# Patient Record
Sex: MALE | Race: WHITE | Employment: UNEMPLOYED | URBAN - METROPOLITAN AREA
[De-identification: names, ages, dates, MRNs, and addresses within clinical notes are randomized per-mention and may not be internally consistent; named-entity substitution may affect disease eponyms.]

---

## 2021-07-29 ENCOUNTER — OFFICE VISIT (OUTPATIENT)
Dept: FAMILY MEDICINE | Facility: CLINIC | Age: 36
End: 2021-07-29
Payer: MEDICAID

## 2021-07-29 VITALS
DIASTOLIC BLOOD PRESSURE: 79 MMHG | TEMPERATURE: 98.1 F | SYSTOLIC BLOOD PRESSURE: 133 MMHG | HEART RATE: 89 BPM | BODY MASS INDEX: 27.77 KG/M2 | HEIGHT: 71 IN | OXYGEN SATURATION: 100 % | WEIGHT: 198.4 LBS

## 2021-07-29 DIAGNOSIS — Z13.220 SCREENING FOR HYPERLIPIDEMIA: ICD-10-CM

## 2021-07-29 DIAGNOSIS — Z00.00 ROUTINE GENERAL MEDICAL EXAMINATION AT A HEALTH CARE FACILITY: Primary | ICD-10-CM

## 2021-07-29 DIAGNOSIS — G40.409 GRAND MAL SEIZURE (H): ICD-10-CM

## 2021-07-29 DIAGNOSIS — Z11.59 NEED FOR HEPATITIS C SCREENING TEST: ICD-10-CM

## 2021-07-29 DIAGNOSIS — Z11.4 SCREENING FOR HIV (HUMAN IMMUNODEFICIENCY VIRUS): ICD-10-CM

## 2021-07-29 LAB
CHOLEST SERPL-MCNC: 158 MG/DL
FASTING STATUS PATIENT QL REPORTED: YES
HDLC SERPL-MCNC: 79 MG/DL
LDLC SERPL CALC-MCNC: 66 MG/DL
NONHDLC SERPL-MCNC: 79 MG/DL
TRIGL SERPL-MCNC: 66 MG/DL

## 2021-07-29 PROCEDURE — 90471 IMMUNIZATION ADMIN: CPT | Performed by: PHYSICIAN ASSISTANT

## 2021-07-29 PROCEDURE — 87389 HIV-1 AG W/HIV-1&-2 AB AG IA: CPT | Performed by: PHYSICIAN ASSISTANT

## 2021-07-29 PROCEDURE — 90732 PPSV23 VACC 2 YRS+ SUBQ/IM: CPT | Performed by: PHYSICIAN ASSISTANT

## 2021-07-29 PROCEDURE — 36415 COLL VENOUS BLD VENIPUNCTURE: CPT | Performed by: PHYSICIAN ASSISTANT

## 2021-07-29 PROCEDURE — 90715 TDAP VACCINE 7 YRS/> IM: CPT | Performed by: PHYSICIAN ASSISTANT

## 2021-07-29 PROCEDURE — 90472 IMMUNIZATION ADMIN EACH ADD: CPT | Performed by: PHYSICIAN ASSISTANT

## 2021-07-29 PROCEDURE — 80061 LIPID PANEL: CPT | Performed by: PHYSICIAN ASSISTANT

## 2021-07-29 PROCEDURE — 86803 HEPATITIS C AB TEST: CPT | Performed by: PHYSICIAN ASSISTANT

## 2021-07-29 PROCEDURE — 99385 PREV VISIT NEW AGE 18-39: CPT | Mod: 25 | Performed by: PHYSICIAN ASSISTANT

## 2021-07-29 RX ORDER — LEVETIRACETAM 1000 MG/1
1000 TABLET ORAL DAILY
Qty: 180 TABLET | Refills: 1 | Status: SHIPPED | OUTPATIENT
Start: 2021-07-29 | End: 2021-11-05

## 2021-07-29 ASSESSMENT — ENCOUNTER SYMPTOMS
MYALGIAS: 0
NERVOUS/ANXIOUS: 0
HEMATOCHEZIA: 0
SORE THROAT: 0
PARESTHESIAS: 0
HEARTBURN: 0
JOINT SWELLING: 0
HEADACHES: 1
WEAKNESS: 0
DIARRHEA: 1
CONSTIPATION: 0
FEVER: 0
ARTHRALGIAS: 0
NAUSEA: 0
DYSURIA: 0
DIZZINESS: 0
EYE PAIN: 0
FREQUENCY: 0
PALPITATIONS: 0
SHORTNESS OF BREATH: 0
CHILLS: 0
COUGH: 0
HEMATURIA: 0
ABDOMINAL PAIN: 0

## 2021-07-29 ASSESSMENT — MIFFLIN-ST. JEOR: SCORE: 1861.19

## 2021-07-29 NOTE — PROGRESS NOTES
SUBJECTIVE:   CC: Ángel Nesbitt is an 36 year old male who presents for preventative health visit with sister and mother      Patient has been advised of split billing requirements and indicates understanding: Yes  Healthy Habits:     Getting at least 3 servings of Calcium per day:  Yes    Bi-annual eye exam:  Yes    Dental care twice a year:  NO    Sleep apnea or symptoms of sleep apnea:  None    Diet:  Regular (no restrictions)    Frequency of exercise:  2-3 days/week    Duration of exercise:  15-30 minutes    Taking medications regularly:  Yes    Barriers to taking medications:  None    Medication side effects:  None    PHQ-2 Total Score: 0    Additional concerns today:  No          -------------------------------------    Today's PHQ-2 Score:   PHQ-2 ( 1999 [employer]) 7/29/2021   Q1: Little interest or pleasure in doing things 0   Q2: Feeling down, depressed or hopeless 0   PHQ-2 Score 0   Q1: Little interest or pleasure in doing things Not at all   Q2: Feeling down, depressed or hopeless Not at all   PHQ-2 Score 0       Abuse: Current or Past(Physical, Sexual or Emotional)- No  Do you feel safe in your environment? Yes    Have you ever done Advance Care Planning? (For example, a Health Directive, POLST, or a discussion with a medical provider or your loved ones about your wishes): No, advance care planning information given to patient to review.  Patient declined advance care planning discussion at this time.    Social History     Tobacco Use     Smoking status: Current Every Day Smoker     Packs/day: 0.50     Types: Cigarettes     Smokeless tobacco: Never Used   Substance Use Topics     Alcohol use: Never     If you drink alcohol do you typically have >3 drinks per day or >7 drinks per week? No    Alcohol Use 7/29/2021   Prescreen: >3 drinks/day or >7 drinks/week? No       Last PSA: No results found for: PSA    Reviewed orders with patient. Reviewed health maintenance and updated orders accordingly -  "Yes      Reviewed and updated as needed this visit by clinical staff  Tobacco  Allergies  Meds   Med Hx  Surg Hx  Fam Hx  Soc Hx      Patient had a grand mal seizure 7/23/21 work up was done in [place] where the patient was traveling.  Hospital notes reviewed.  Patient amenable to neurology referral for additional work up. Medication management continued.     Review of Systems   Constitutional: Negative for chills and fever.   HENT: Negative for congestion, ear pain, hearing loss and sore throat.    Eyes: Negative for pain and visual disturbance.   Respiratory: Negative for cough and shortness of breath.    Cardiovascular: Negative for chest pain, palpitations and peripheral edema.   Gastrointestinal: Positive for diarrhea. Negative for abdominal pain, constipation, heartburn, hematochezia and nausea.   Genitourinary: Negative for dysuria, frequency, genital sores, hematuria and urgency.   Musculoskeletal: Negative for arthralgias, joint swelling and myalgias.   Skin: Negative for rash.   Neurological: Positive for headaches. Negative for dizziness, weakness and paresthesias.   Psychiatric/Behavioral: Negative for mood changes. The patient is not nervous/anxious.          OBJECTIVE:   /79   Pulse 89   Temp 98.1  F (36.7  C) (Oral)   Ht 1.81 m (5' 11.26\")   Wt 90 kg (198 lb 6.4 oz)   SpO2 100%   BMI 27.47 kg/m      Physical Exam  GENERAL: healthy, alert and no distress  EYES: Eyes grossly normal to inspection, PERRL and conjunctivae and sclerae normal  HENT: ear canals and TM's normal, nose and mouth without ulcers or lesions  NECK: no adenopathy, no asymmetry, masses, or scars and thyroid normal to palpation  RESP: lungs clear to auscultation - no rales, rhonchi or wheezes  CV: regular rate and rhythm, normal S1 S2, no S3 or S4, no murmur, click or rub, no peripheral edema and peripheral pulses strong  ABDOMEN: soft, nontender, no hepatosplenomegaly, no masses and bowel sounds normal  MS: no gross " "musculoskeletal defects noted, no edema  SKIN: no suspicious lesions or rashes  NEURO: Normal strength and tone, mentation intact and speech normal  PSYCH: mentation appears normal, affect normal/bright    Diagnostic Test Results:  none     ASSESSMENT/PLAN:   1. Routine general medical examination at a health care facility    2. Grand mal seizure (H)    3. Screening for HIV (human immunodeficiency virus)    4. Need for hepatitis C screening test  - Hepatitis C Screen Reflex to HCV RNA Quant and Genotype; Future    5. Screening for hyperlipidemia      Patient has been advised of split billing requirements and indicates understanding: Yes  COUNSELING:   Reviewed preventive health counseling, as reflected in patient instructions    Estimated body mass index is 27.47 kg/m  as calculated from the following:    Height as of this encounter: 1.81 m (5' 11.26\").    Weight as of this encounter: 90 kg (198 lb 6.4 oz).     Weight management plan: Discussed healthy diet and exercise guidelines    He reports that he has been smoking cigarettes. He has been smoking about 0.50 packs per day. He has never used smokeless tobacco.  Tobacco Cessation Action Plan:   Information offered: Patient not interested at this time      Counseling Resources:  ATP IV Guidelines  Pooled Cohorts Equation Calculator  FRAX Risk Assessment  ICSI Preventive Guidelines  Dietary Guidelines for Americans, 2010  USDA's MyPlate  ASA Prophylaxis  Lung CA Screening    Joselito Mayen PA-C  [facility]  "

## 2021-07-30 LAB
HCV AB SERPL QL IA: NONREACTIVE
HIV 1+2 AB+HIV1 P24 AG SERPL QL IA: NONREACTIVE

## 2021-08-03 ENCOUNTER — HOSPITAL ENCOUNTER (OUTPATIENT)
Dept: BEHAVIORAL HEALTH | Facility: CLINIC | Age: 36
Discharge: HOME | End: 2021-08-03
Attending: FAMILY MEDICINE | Admitting: FAMILY MEDICINE
Payer: MEDICAID

## 2021-08-03 ENCOUNTER — TELEPHONE (OUTPATIENT)
Dept: BEHAVIORAL HEALTH | Facility: CLINIC | Age: 36
End: 2021-08-03

## 2021-08-03 VITALS — WEIGHT: 197 LBS | HEIGHT: 72 IN | BODY MASS INDEX: 26.68 KG/M2

## 2021-08-03 PROCEDURE — H0001 ALCOHOL AND/OR DRUG ASSESS: HCPCS | Mod: GT | Performed by: COUNSELOR

## 2021-08-03 ASSESSMENT — ANXIETY QUESTIONNAIRES
7. FEELING AFRAID AS IF SOMETHING AWFUL MIGHT HAPPEN: NOT AT ALL
2. NOT BEING ABLE TO STOP OR CONTROL WORRYING: NOT AT ALL
GAD7 TOTAL SCORE: 3
1. FEELING NERVOUS, ANXIOUS, OR ON EDGE: SEVERAL DAYS
4. TROUBLE RELAXING: NOT AT ALL
5. BEING SO RESTLESS THAT IT IS HARD TO SIT STILL: NOT AT ALL
3. WORRYING TOO MUCH ABOUT DIFFERENT THINGS: SEVERAL DAYS
6. BECOMING EASILY ANNOYED OR IRRITABLE: SEVERAL DAYS

## 2021-08-03 ASSESSMENT — PATIENT HEALTH QUESTIONNAIRE - PHQ9: SUM OF ALL RESPONSES TO PHQ QUESTIONS 1-9: 9

## 2021-08-03 ASSESSMENT — MIFFLIN-ST. JEOR: SCORE: 1858.65

## 2021-08-03 ASSESSMENT — PAIN SCALES - GENERAL: PAINLEVEL: NO PAIN (0)

## 2021-08-03 NOTE — TELEPHONE ENCOUNTER
8/3/2021  Pt completed his IGOR CA today. He is looking for a mental health therapist at this time. He was referred to [facility].    [facility] Assessment ID: [id number]

## 2021-08-03 NOTE — PROGRESS NOTES
"[facility]  Provider Name:  Zahraa Lara MA [facility]  Provider Phone Number: [phone]    PATIENT'S NAME: Ángel Nesbitt  PREFERRED NAME: Eliza  PRONOUNS:  he/him     MRN: [id number]  : 1985  ADDRESS: [address]  [place] [address]  ACCT. NUMBER:  [id number]  DATE OF SERVICE: 21  START TIME: 1:09pm  END TIME: 2:40pm  PREFERRED PHONE: [phone]  May we leave a program related message: Yes  SERVICE MODALITY:  Video Visit:      Provider verified identity through the following two step process.  Patient provided:  Patient  and Patient's last 4 digits of N    Telemedicine Visit: The patient's condition can be safely assessed and treated via synchronous audio and visual telemedicine encounter.      Reason for Telemedicine Visit: Patient has requested telehealth visit    Originating Site (Patient Location): Patient's home    Distant Site (Provider Location): Provider Remote Setting- Home Office    Consent:  The patient/guardian has verbally consented to: the potential risks and benefits of telemedicine (video visit) versus in person care; bill my insurance or make self-payment for services provided; and responsibility for payment of non-covered services.     Patient would like the video invitation sent by:  My Chart    Mode of Communication:  Video Conference via Impraise    As the provider I attest to compliance with applicable laws and regulations related to telemedicine.    UNIVERSAL ADULT Substance Use Disorder DIAGNOSTIC ASSESSMENT    Identifying Information:  Patient is a 36 year old, [place].  The pronoun use throughout this assessment reflects the patient's chosen pronoun.  Patient was referred for an assessment by family.  Patient attended the session alone.     Chief Complaint:   The reason for seeking services at this time is: \"/, I had a problem with alcohol. I was drinking every day. I went to rehab. Just " "depression. Loosing someone every day. I made it a habit every night. The people around me would drink every night. During the day chew Khaat leaves. It became a daily habit. I broke it off in 2017. From 2017, I became moderate. I went to rehab and I was off a year.\" Patient has attempted to resolve these concerns in the past through treatment and AA meetings.  Patient does not appear to be in severe withdrawal, an imminent safety risk to self or others, or requiring immediate medical attention and may proceed with the assessment interview.    Social/Family History:  Patient reported they grew up in [place], [place].  They were raised by their mom mostly. Patient reported that their childhood was \"a lot of fun. Childhood was nice. A little bit of pressure for being the eldest son. Other than that, a nice upbringing. I was very athletic. I played soccer and basketball.\" He has 8 siblings and he has 2 older sisters and he is the eldest boy. Patient describes current relationships with family of origin as good.      The patient describes their cultural background as [place]. He was raised as [facility]. Cultural influences and impact on patient's life structure, values, norms, and healthcare: none identified.  Contextual influences on patient's health include: none identified.  Patient identified their preferred language to be English. Patient reported they does not need the assistance of an  or other support involved in therapy.  Patient reports they are involved in community of joi activities. \"I am not really religous.\" They reports spirituality impacts recovery in the following ways: \"my family is very spiritual, but I am not. It does. If I was a 1/10th more leaning towards Orthodoxy, it would help me more.\"     Patient reported had no significant delays in developmental tasks. Patient's highest education level was some college. Patient identified the following learning problems: attention. He reports a history of " "ADHD and a studder as a child. Patient reports they are able to understand written materials.    Patient's current relationship status is single for almost 3 years.  Patient identified their sexual orientation as heterosexual.  Patient reported having two child(adi). He has a 10 year old daughter, and a 5 year old son.    Patient's current living/housing situation involves staying with his sister.  They live with their sister and they report that housing is stable. He reports his mom lives across the street. Patient identified mother, siblings and friends as part of their support system.  Patient identified the quality of these relationships as good.      Patient reports engaging in the following recreational/leisure activities: \"I like to play billiards, basketball, sometimes I like to go jogging. Mostly I like playing basketball or shooting pool.\"  Patient is currently is employed as a contractor.  Patient reports their income is obtained through savings/shares in [employer].  Patient does not identify finances as a current stressor.      Patient denies substance related arrests or legal issues.  Patient denies being on probation / parole / under the jurisdiction of the court.      Patient's Strengths and Limitations:  Patient identified the following strengths or resources that will help them succeed in treatment: commitment to health and well being, friends / good social support, family support and insight. Things that may interfere with the patient's success in treatment include: none identified.     Personal and Family Medical History:  Patient did not report a family history of mental health concerns.  Patient reports family history is not on file.    Patient reported the following previous mental health diagnoses: \"he said I got bipolar. I don't really believe it, but that is what the doctor said.\"  Patient reports their primary mental health symptoms include:  anxiety and these do not impact his ability to " function.  Patient has received mental health services in the past: therapy.  Psychiatric Hospitalizations: None.  Patient denies a history of civil commitment.  Current mental health services/providers include:  none.    GAIN-SS Tool:    When was the last time that you had significant problems... 8/3/2021   with feeling very trapped, lonely, sad, blue, depressed or hopeless about the future? Past month   with sleep trouble, such as bad dreams, sleeping restlessly, or falling asleep during the day? Past Month   with feeling very anxious, nervous, tense, scared, panicked or like something bad was going to happen? 1+ years ago   with becoming very distressed & upset when something reminded you of the past? 1+ years ago   with thinking about ending your life or committing suicide? Never     When was the last time that you did the following things 2 or more times? 8/3/2021   Lied or conned to get things you wanted or to avoid having to do something? Never   Had a hard time paying attention at school, work or home? Never   Had a hard time listening to instructions at school, work or home? Never   Were a bully or threatened other people? 1+ years ago   Started physical fights with other people? Never       Patient has had a physical exam to rule out medical causes for current symptoms.  Date of last physical exam was last week. The patient has a [facility].  Patient reports the following current medical concerns: seizure on 7/22/2021.  Patient denies any issues with pain.  There are significant appetite / nutritional concerns / weight changes.  Patient does not report a history of an eating disorder.  Patient does not report a history of head injury / trauma / cognitive impairment.      Current Outpatient Medications   Medication     levETIRAcetam (KEPPRA) 1000 MG tablet     No current facility-administered medications for this encounter.     Medication Adherence:  Patient reports taking prescribed  medications as prescribed.    Patient Allergies:  No Known Allergies    Medical History:  History reviewed. No pertinent past medical history.     Rating Scales:    PHQ9:    PHQ-9 SCORE 8/3/2021   PHQ-9 Total Score 9     GAD7:    ADAM-7 SCORE 8/3/2021   Total Score 3       Substance Use:  Patient reports a brother who used and a sister as well who had an addiction problem.  Patient has received substance use disorder and/or gambling treatment in the past.  Patient reports the following dates and locations of treatment services:  rehab twice, the last time was 2017.  Patient has not ever been to detox.  Patient is not currently receiving any chemical dependency treatment. Patient reports they have attended the following support groups: AA meetings in the past. The last time he attended was in 2018.        Substance Age of first use Pattern and duration of use (include amounts and frequency) Date of last use     Withdrawal potential Route of administration   has used Alcohol 8th grade HU: 2014/2015-2017: daily use 2/2020 no oral   has used Marijuana   8th grade HU: 3936-4945: daily use    Past year: gets paranoid when using. Uses maybe once a month and he will take a couple of puffs.   2 months ago no smoke   has not used Amphetamines          has not used Cocaine/crack           has not used Hallucinogens        has not used Inhalants        has not used Heroin        has used Other Opiates  HU: past few months for foot pain.  no A few months ago no    has not used Benzodiazepine   '17 Xanax:  Abuse: no  Non-prescribed, but would use it for sleep.  2017: hx of being prescribed for 6 months.   6/2021 no oral   has not used Barbiturates        has not used Over the counter meds.        has use Caffeine 20 Energy Drink: drinking every other year.  Last use: 7/22/21    Coffee: hx of daily use. He'd have 3 cups a day.   Last use: 7/22/21 7/22/21 no oral   has used Nicotine  17 1 pack over 3-4 days. 8/3/21 no smoke   has  "used other substances not listed above:  Identify:  16 Khaat:  First use: 17  Last use: 1.5 years ago  HU: 9174-4187, daily use 1.5 years ago no oral       Patient reported the following problems as a result of their substance use: family problems.  Patient first became concerned about their substance use \"when I was 17.\" Patient reports his mom and his sister are concerned about their substance use.  Patient reports their recovery goals are to address his mental health and stay sober.     Patient reports experiencing the following withdrawal symptoms within the past 12 months: none and the following within the past 30 days: none.  Patients reports no urges/cravings to use Alcohol.  Patient reports he has used more Alcohol than intended and over a longer period of time than intended in the past. Patient reports he has had unsuccessful attempts to cut down or control use of Alcohol.  Patient reports longest period of abstinence was about a year and return to use was due to socialize. He is staying sober currently because \"I wanted to. One thing that helps me is being around my family. I don't want the kids to see me out of line.\" Patient reports he has needed to use more Alcohol to achieve the same effect.  Patient does  report diminished effect with use of same amount of Alcohol.     Patient does report a great deal of time is spent in activities necessary to obtain, use, or recover from Alcohol effects.  Patient does report important social, occupational, or recreational activities are given up or reduced because of Alcohol use.  Alcohol use is continued despite knowledge of having a persistent or recurrent physical or psychological problem that is likely to have caused or exacerbated by use.  Patient reports the following problem behaviors while under the influence of substances: none reported.     Patient reports substance use has not ever impacted their ability to function in a school setting. Patient reports " substance use has not ever impacted their ability to function in a work setting.  Patients demographics and history impact their recovery in the following ways:  Family support.  Patient reports engaging in the following recreation/leisure activities while using:  With his friends.  Patient reports the following people are supportive of recovery: family.    Patient does not have a history of gambling concerns and/or treatment.  Patient does not have other addictive behaviors he is concerned about.        Dimension Scale Ratings:    Dimension 1 -  Acute Intoxication/Withdrawal: 0 - No Problem  Dimension 2 - Biomedical: 0 - No Problem  Dimension 3 - Emotional/Behavioral/Cognitive Conditions: 2 - Moderate Problem  Dimension 4 - Readiness to Change:  0 - No Problem  Dimension 5 - Relapse/Continued Use/ Continued Problem Potential: 2 - Moderate Problem  Dimension 6 - Recovery Environment:  2 - Moderate Problem    Significant Losses / Trauma / Abuse / Neglect Issues:   Patient did not serve in the .  There are indications or report of significant loss, trauma, abuse or neglect issues related to: when he lived in [place], a lot of friends  from suicide bombers or were severely injuried. He reports no history of abuse.  Concerns for possible neglect are not present.     Safety Assessment:   Current Safety Concerns:  [place] Suicide Severity Rating Scale (Short Version)  [place] Suicide Severity Rating (Short Version) 8/3/2021   Over the past 2 weeks have you felt down, depressed, or hopeless? no   Over the past 2 weeks have you had thoughts of killing yourself? no   Have you ever attempted to kill yourself? no     Patient denies current homicidal ideation and behaviors.  Patient denies current self-injurious ideation and behaviors.    Patient denied risk behaviors associated with substance use.  Patient denies any high risk behaviors associated with mental health symptoms.  Patient reports the following  current concerns for their personal safety: None.  Patient reports there are no firearms in the house.           History of Safety Concerns:  Patient denied a history of homicidal ideation.     Patient denied a history of personal safety concerns.    Patient denied a history of assaultive behaviors.    Patient denied a history of sexual assault behaviors.     Patient denied a history of risk behaviors associated with substance use.  Patient denies any history of high risk behaviors associated with mental health symptoms.  Patient reports the following protective factors: forward or future oriented thinking;dedication to family or friends;safe and stable environment;regular physical activity;sense of belonging;purpose;help seeking behaviors when distressed;abstinence from substances;adherence with prescribed medication;living with other people;daily obligations;structured day;sense of meaning;positive social skills;financial stability    Risk Plan:  See Recommendations for Safety and Risk Management Plan    Review of Symptoms per patient report:  Substance Use:  family relationship problems due to substance use     Collateral Contact Summary:   Collateral contacts contributing to this assessment:  [facility]    If court related records were reviewed, summarize here: NA    Information from collateral contacts supported/largely agreed with information from the client and associated risk ratings.    Information in this assessment was obtained from the medical record and provided by patient who is a good historian.    Patient will have open access to their mental health medical record.    Diagnostic Criteria:  1.) Alcohol/drug is often taken in larger amounts or over a longer period than was intended.  Met for Alcohol.  2.) There is a persistent desire or unsuccessful efforts to cut down or control alcohol/drug use.  Met for Alcohol.  3.) A great deal of time is spent in activities necessary to obtain alcohol,  use alcohol, or recover from its effects.  Met for Alcohol.  6.) Continued alcohol use despite having persistent or recurrent social or interpersonal problems caused or exacerbated by the effects of alcohol/drug.  Met for Alcohol.  7.) Important social, occupational, or recreational activities are given up or reduced because of alcohol/drug use.  Met for Alcohol.  9.) Alcohol/drug use is continued despite knowledge of having a persistent or recurrent physical or psychological problem that is likely to have been caused or exacerbated by alcohol.  Met for Alcohol.  10.) Tolerance, as defined by either of the following: A need for markedly increased amounts of alcohol/drug to achieve intoxication or desired effect..  Met for Alcohol.      As evidenced by self report and criteria, client meets the following DSM5 Diagnoses:   (Sustained by DSM5 Criteria Listed Above)    Category of Substance Severity (ICD-10 Code / DSM 5 Code)     Alcohol Use Disorder Severe  (10.20) (303.90) in sustained remission   Cannabis Use Disorder The patient does not meet the criteria for a Cannabis use disorder.   Hallucinogen Use Disorder The patient does not meet the criteria for a Hallucinogen use disorder.   Inhalant Use Disorder The patient does not meet the criteria for an Inhalant use disorder.   Opioid Use Disorder The patient does not meet the criteria for an Opioid use disorder.   Sedative, Hypnotic, or Anxiolytic Use Disorder The patient does not meet the criteria for a Sedative/Hypnotic use disorder.   Stimulant Related Disorder The patient does not meet the criteria for a Stimulant use disorder.   Tobacco Use Disorder Moderate   (F17.200) (305.1)   Other (or unknown) Substance Use Disorder The patient does not meet the criteria for a Other (or unknown) Substance use disorder.     Recommendations:     1. Plan for Safety and Risk Management:  Recommended that patient call 911 or go to the local ED should there be a change in any of  these risk factors. Report to child / adult protection services was NA.     2. IGOR Referrals:   Recommendations:    1)  Attend 6 individual weekly psychotherapy sessions that offer a chemical dependency component to it.  2)  Abstain from all mood-altering chemicals unless prescribed by a licensed provider.     Patient reports they are willing to follow these recommendations.  Patient would like the following family or other support people involved in their treatment:  NA. Patient does not have a history of opiate use.    3. Mental Health Referrals:  [facility]     4. Patient's identified wanting to address his mental health.     5. Recommendations for treatment focus: sober coping skills, mental health skills.          Provider Name/ Credentials:  Zahraa Lara MA [facility]  August 3, 2021

## 2021-08-04 ASSESSMENT — ANXIETY QUESTIONNAIRES: GAD7 TOTAL SCORE: 3

## 2021-08-06 NOTE — TELEPHONE ENCOUNTER
8/6/2021  Pt left a vm requesting his IGOR CA be faxed to [facility]. He signed a MICHAEL via [employer] for them and his IGOR CA was faxed to [facility] [facility] today.

## 2021-08-22 ENCOUNTER — HEALTH MAINTENANCE LETTER (OUTPATIENT)
Age: 36
End: 2021-08-22

## 2021-09-17 ENCOUNTER — VIRTUAL VISIT (OUTPATIENT)
Dept: NEUROLOGY | Facility: CLINIC | Age: 36
End: 2021-09-17
Payer: COMMERCIAL

## 2021-09-17 DIAGNOSIS — G40.409 GRAND MAL SEIZURE (H): ICD-10-CM

## 2021-09-17 PROCEDURE — 99215 OFFICE O/P EST HI 40 MIN: CPT | Mod: 95 | Performed by: PSYCHIATRY & NEUROLOGY

## 2021-09-17 RX ORDER — PROPRANOLOL HCL 60 MG
CAPSULE, EXTENDED RELEASE 24HR ORAL
Qty: 180 CAPSULE | Refills: 1 | Status: SHIPPED | OUTPATIENT
Start: 2021-09-17

## 2021-09-17 RX ORDER — ACETAMINOPHEN 500 MG
500-1000 TABLET ORAL EVERY 6 HOURS PRN
COMMUNITY

## 2021-09-17 RX ORDER — SUMATRIPTAN 50 MG/1
50 TABLET, FILM COATED ORAL
Qty: 12 TABLET | Refills: 3 | Status: SHIPPED | OUTPATIENT
Start: 2021-09-17 | End: 2021-11-05

## 2021-09-17 RX ORDER — LORATADINE 10 MG/1
10 TABLET ORAL DAILY
COMMUNITY

## 2021-09-17 RX ORDER — LEVETIRACETAM 500 MG/1
TABLET ORAL
Qty: 180 TABLET | Refills: 1 | Status: SHIPPED | OUTPATIENT
Start: 2021-09-17

## 2021-09-17 NOTE — PROGRESS NOTES
Ángel is a 36 year old who is being evaluated via a billable video visit.      How would you like to obtain your AVS? MyChart  If the video visit is dropped, the invitation should be resent by: Text to cell phone: [phone]  Will anyone else be joining your video visit? No    Video Start Time: 3:07  Video-Visit Details    Type of service:  Video Visit    Video End Time:3:48  Originating Location (pt. Location): Home    Distant Location (provider location):  [facility] NEUROLOGY CLINIC [place]     Platform used for Video Visit: BBE     [facility]/Neurology History and Physical Examination      Patient:  Ángel Nesbitt  :  1985   Age:  36 year old   Today's Virtual Visit:  2021    Referring Provider:    Joselito Mayen PA-C  [address] [facility] [address] [facility] [place] [address]    History of Present Illness:    Mr. Ángel Nesbitt is a 37 yo right handed male who was referred by his PCP for evaluation of an isolated convulsion. He had a seizure  when he was flying to [place]. He had a lay over in [place] and then in [place] for 6 hrs. He was very tired and had a migraine. He was sleep-deprived.  He had 3 beers at the airport, which is not unusual for him.  In [place] when he was sleeping at the gate, he was kicked by the police to wake up and show his passport. He went back to sleep and then woke up to people standing over him and asking him if he knew what happened. Someone who spoke English told him that he had a seizure, he shook all over and stiffened up and foamed at the mouth. He doesn't know how long it lasted. He remembers he was on the floor. He doesn't know if he bit his tongue. They told him he had to go to the hospital in [place]. He had another seizure while in the ambulance.  He had spinal tap in the hospital which was negative. He was started on levetiracetam. He was discharged . He cancelled his flight to [place] and returned to [place].   Couple yrs  ago when he was in [facility], had some events with shaking in sleep. He was drinking a lot at that time.   He has a h/o migraine headaches since he was a kid. It is throbbing, it can get up to 10 in scale of 1-10, he feels nauseous, feels he is going to pass out. He has photophobia. Initially he feels very weak. His left eye twitches. He goes to a dark quiet room, and doesn't want to be around anyone. He takes 1000 mg Tylenol. The headaches temporarily improves but comes back.  They happen once a week. His mom has a h/o migraine HA.   In 2015-16, when he was in [facility], he witnessed a suicidal attack and a glass went to his scalp. He still gets flashbacks of that.   Epilepsy Risk Factors:  No known RFs  Precipitating factors:  Probably sleep deprivation and tiredness.   Past Medical/Surgical History: migraine headache, seasonal allergies.   Social History     Socioeconomic History     Marital status: Single     Spouse name: Not on file     Number of children: Not on file     Years of education: Not on file     Highest education level: Not on file   Occupational History     Not on file   Tobacco Use     Smoking status: Current Every Day Smoker     Packs/day: 0.50     Types: Cigarettes     Smokeless tobacco: Never Used   Vaping Use     Vaping Use: Never used   Substance and Sexual Activity     Alcohol use: Never     Drug use: Never     Sexual activity: Not Currently   Other Topics Concern     Parent/sibling w/ CABG, MI or angioplasty before 65F 55M? Not Asked   Social History Narrative     Not on file     Social Determinants of Health     Financial Resource Strain:      Difficulty of Paying Living Expenses:    Food Insecurity:      Worried About Running Out of Food in the Last Year:      Ran Out of Food in the Last Year:    Transportation Needs:      Lack of Transportation (Medical):      Lack of Transportation (Non-Medical):    Physical Activity:      Days of Exercise per Week:      Minutes of Exercise per Session:   "  Stress:      Feeling of Stress :    Social Connections:      Frequency of Communication with Friends and Family:      Frequency of Social Gatherings with Friends and Family:      Attends Oriental orthodox Services:      Active Member of Clubs or Organizations:      Attends Club or Organization Meetings:      Marital Status:    Intimate Partner Violence:      Fear of Current or Ex-Partner:      Emotionally Abused:      Physically Abused:      Sexually Abused:       Employment/School:  He has a h/o alcohol abuse, but quit, currently drinks occasionally. He used to smoke marijuana but not anymore since early 2019. 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 He is single, has 2 kids.     Driving:  Patient should not drive till 10/12.     Current Outpatient Medications   Medication Sig Dispense Refill     acetaminophen (TYLENOL) 500 MG tablet Take 500-1,000 mg by mouth every 6 hours as needed for mild pain       levETIRAcetam (KEPPRA) 1000 MG tablet Take 1 tablet (1,000 mg) by mouth daily 180 tablet 1     loratadine (CLARITIN) 10 MG tablet Take 10 mg by mouth daily       Perceived AED Side Effects: he feels \"lazy\" sometimes. Denies dizziness, imbalance, tiredness irritability or other side effects.       Past AEDs:  AED - ANTIEPILEPTIC DRUGS 7/29/2021   levETIRAcetam (Oral) 1,000 mg Daily     Exam:    Wt Readings from Last 5 Encounters:   08/03/21 89.4 kg (197 lb)   07/29/21 90 kg (198 lb 6.4 oz)     Alert, awake, NAD, no aphasia or dysarthria, EOMI, face symmetric, moves upper extremities against gravity, normal finger to nose.    Assessment and Plan:  #1: An isolated convulsion: The patient had a convulsion, which per description is convincing for a generalized tonic-clonic seizure in the setting of sleep deprivation and exhaustion.  He also had episodes of shaking and sleep few years ago when he was abusing alcohol.  He was a started on levetiracetam and did not have any more seizures.  I discussed an evaluation for epilepsy " including an EEG to look for potential interictal abnormalities and a brain MRI looking for possible structural abnormalities.    #2: Migraine headaches: the patient has a lifelong history of headaches.  They happen once a week. He hasn't used any prophylactic medication. I discussed starting propranolol.     -Continue levetiracetam 500 mg twice daily.  -Obtain levetiracetam level for efficacy and compliance.  -3-hour video EEG  -3 Juliana brain MRI with seizure protocol  -Start propranolol ER 60 mg daily for 1 week and then increase to 60 mg twice a day  -Take Imitrex 50 mg at onset of headache.  May repeat once in 2 hours.  Do not exceed 2 tablets in 24 hours.  -Follow-up in 2 months.      As described above, I met with the patient for 41 minutes and during this time counseling was within 50% of the visit time.  Spent an additional 15 minutes on chart review and documentation. This note was created in part by the use of Dragon voice recognition system. Inadvertent grammatical errors and typographical errors may still exist.  Neha Cr MD

## 2021-09-17 NOTE — LETTER
2021         RE: Ángel Nesbitt  [address]  [place] [address]        Dear Colleague,    Thank you for referring your patient, Ángel Nesbitt, to the [facility] NEUROLOGY CLINIC [place]. Please see a copy of my visit note below.    Ángel is a 36 year old who is being evaluated via a billable video visit.      How would you like to obtain your AVS? MyChart  If the video visit is dropped, the invitation should be resent by: Text to cell phone: [phone]  Will anyone else be joining your video visit? No    Video Start Time: 3:07  Video-Visit Details    Type of service:  Video Visit    Video End Time:3:48  Originating Location (pt. Location): Home    Distant Location (provider location):  [facility] NEUROLOGY [facility]     Platform used for Video Visit: [employer]     [facility]/Neurology History and Physical Examination      Patient:  Ángel Nesbitt  :  1985   Age:  36 year old   Today's Virtual Visit:  2021    Referring Provider:    Joselito Mayen PA-C  [address] [place] [address]    History of Present Illness:    Mr. Ángel Nesbitt is a 35 yo right handed male who was referred by his PCP for evaluation of an isolated convulsion. He had a seizure  when he was flying to [place]. He had a lay over in [place] and then in [place] for 6 hrs. He was very tired and had a migraine. He was sleep-deprived.  He had 3 beers at the airport, which is not unusual for him.  In [place] when he was sleeping at the gate, he was kicked by the police to wake up and show his passport. He went back to sleep and then woke up to people standing over him and asking him if he knew what happened. Someone who spoke English told him that he had a seizure, he shook all over and stiffened up and foamed at the mouth. He doesn't know how long it lasted. He remembers he was on the floor. He doesn't know if he bit his tongue. They told him  he had to go to the hospital in [place]. He had another seizure while in the ambulance.  He had spinal tap in the hospital which was negative. He was started on levetiracetam. He was discharged 7/25. He cancelled his flight to [place] and returned to [place].   Couple yrs ago when he was in [facility], had some events with shaking in sleep. He was drinking a lot at that time.   He has a h/o migraine headaches since he was a kid. It is throbbing, it can get up to 10 in scale of 1-10, he feels nauseous, feels he is going to pass out. He has photophobia. Initially he feels very weak. His left eye twitches. He goes to a dark quiet room, and doesn't want to be around anyone. He takes 1000 mg Tylenol. The headaches temporarily improves but comes back.  They happen once a week. His mom has a h/o migraine HA.   In 2015-16, when he was in [place], he witnessed a suicidal attack and a glass went to his scalp. He still gets flashbacks of that.   Epilepsy Risk Factors:  No known RFs  Precipitating factors:  Probably sleep deprivation and tiredness.   Past Medical/Surgical History: migraine headache, seasonal allergies.   Social History     Socioeconomic History     Marital status: Single     Spouse name: Not on file     Number of children: Not on file     Years of education: Not on file     Highest education level: Not on file   Occupational History     Not on file   Tobacco Use     Smoking status: Current Every Day Smoker     Packs/day: 0.50     Types: Cigarettes     Smokeless tobacco: Never Used   Vaping Use     Vaping Use: Never used   Substance and Sexual Activity     Alcohol use: Never     Drug use: Never     Sexual activity: Not Currently   Other Topics Concern     Parent/sibling w/ CABG, MI or angioplasty before 65F 55M? Not Asked   Social History Narrative     Not on file     Social Determinants of Health     Financial Resource Strain:      Difficulty of Paying Living Expenses:    Food Insecurity:      Worried About  "Running Out of Food in the Last Year:      Ran Out of Food in the Last Year:    Transportation Needs:      Lack of Transportation (Medical):      Lack of Transportation (Non-Medical):    Physical Activity:      Days of Exercise per Week:      Minutes of Exercise per Session:    Stress:      Feeling of Stress :    Social Connections:      Frequency of Communication with Friends and Family:      Frequency of Social Gatherings with Friends and Family:      Attends Restorationism Services:      Active Member of Clubs or Organizations:      Attends Club or Organization Meetings:      Marital Status:    Intimate Partner Violence:      Fear of Current or Ex-Partner:      Emotionally Abused:      Physically Abused:      Sexually Abused:       Employment/School:  He has a h/o alcohol abuse, but quit, currently drinks occasionally. He used to smoke marijuana but not anymore since early 2019. 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     Driving:  Patient should not drive till 10/12.     Current Outpatient Medications   Medication Sig Dispense Refill     acetaminophen (TYLENOL) 500 MG tablet Take 500-1,000 mg by mouth every 6 hours as needed for mild pain       levETIRAcetam (KEPPRA) 1000 MG tablet Take 1 tablet (1,000 mg) by mouth daily 180 tablet 1     loratadine (CLARITIN) 10 MG tablet Take 10 mg by mouth daily       Perceived AED Side Effects: he feels \"lazy\" sometimes. Denies dizziness, imbalance, tiredness irritability or other side effects.       Past AEDs:  AED - ANTIEPILEPTIC DRUGS 7/29/2021   levETIRAcetam (Oral) 1,000 mg Daily     Exam:    Wt Readings from Last 5 Encounters:   08/03/21 89.4 kg (197 lb)   07/29/21 90 kg (198 lb 6.4 oz)     Alert, awake, NAD, no aphasia or dysarthria, EOMI, face symmetric, moves upper extremities against gravity, normal finger to nose.    Assessment and Plan:  #1: An isolated convulsion: The patient had a convulsion, which per description is convincing for a " generalized tonic-clonic seizure in the setting of sleep deprivation and exhaustion.  He also had episodes of shaking and sleep few years ago when he was abusing alcohol.  He was a started on levetiracetam and did not have any more seizures.  I discussed an evaluation for epilepsy including an EEG to look for potential interictal abnormalities and a brain MRI looking for possible structural abnormalities.    #2: Migraine headaches: the patient has a lifelong history of headaches.  They happen once a week. He hasn't used any prophylactic medication. I discussed starting propranolol.     -Continue levetiracetam 500 mg twice daily.  -Obtain levetiracetam level for efficacy and compliance.  -3-hour video EEG  -3 Juliana brain MRI with seizure protocol  -Start propranolol ER 60 mg daily for 1 week and then increase to 60 mg twice a day  -Take Imitrex 50 mg at onset of headache.  May repeat once in 2 hours.  Do not exceed 2 tablets in 24 hours.  -Follow-up in 2 months.      As described above, I met with the patient for 41 minutes and during this time counseling was within 50% of the visit time.  Spent an additional 15 minutes on chart review and documentation. This note was created in part by the use of Dragon voice recognition system. Inadvertent grammatical errors and typographical errors may still exist.  Neha Cr MD

## 2021-09-29 ENCOUNTER — ANCILLARY PROCEDURE (OUTPATIENT)
Dept: NEUROLOGY | Facility: CLINIC | Age: 36
End: 2021-09-29
Attending: PSYCHIATRY & NEUROLOGY
Payer: COMMERCIAL

## 2021-09-29 ENCOUNTER — TRANSFERRED RECORDS (OUTPATIENT)
Dept: HEALTH INFORMATION MANAGEMENT | Facility: CLINIC | Age: 36
End: 2021-09-29

## 2021-09-29 DIAGNOSIS — G40.409 GRAND MAL SEIZURE (H): ICD-10-CM

## 2021-10-17 ENCOUNTER — HEALTH MAINTENANCE LETTER (OUTPATIENT)
Age: 36
End: 2021-10-17

## 2021-11-05 ENCOUNTER — OFFICE VISIT (OUTPATIENT)
Dept: NEUROLOGY | Facility: CLINIC | Age: 36
End: 2021-11-05
Payer: COMMERCIAL

## 2021-11-05 VITALS
DIASTOLIC BLOOD PRESSURE: 72 MMHG | HEART RATE: 73 BPM | SYSTOLIC BLOOD PRESSURE: 121 MMHG | WEIGHT: 195 LBS | BODY MASS INDEX: 26.82 KG/M2

## 2021-11-05 DIAGNOSIS — F41.9 ANXIETY: Primary | ICD-10-CM

## 2021-11-05 DIAGNOSIS — G43.019 INTRACTABLE MIGRAINE WITHOUT AURA AND WITHOUT STATUS MIGRAINOSUS: ICD-10-CM

## 2021-11-05 DIAGNOSIS — G40.409 GRAND MAL SEIZURE (H): ICD-10-CM

## 2021-11-05 PROCEDURE — 99213 OFFICE O/P EST LOW 20 MIN: CPT | Performed by: PSYCHIATRY & NEUROLOGY

## 2021-11-05 RX ORDER — SUMATRIPTAN 50 MG/1
50 TABLET, FILM COATED ORAL
Qty: 12 TABLET | Refills: 11 | Status: SHIPPED | OUTPATIENT
Start: 2021-11-05

## 2021-11-05 RX ORDER — GABAPENTIN 100 MG/1
100 CAPSULE ORAL 3 TIMES DAILY
Qty: 93 CAPSULE | Refills: 5 | Status: SHIPPED | OUTPATIENT
Start: 2021-11-05

## 2021-11-05 ASSESSMENT — PAIN SCALES - GENERAL: PAINLEVEL: EXTREME PAIN (8)

## 2021-11-05 NOTE — LETTER
2021         RE: Ángel Nesbitt  [address]  [facility] [address]        Dear Colleague,    Thank you for referring your patient, Ángel Nesbitt, to the [facility] NEUROLOGY CLINIC [place]. Please see a copy of my visit note below.     NEUROLOGY PROGRESS NOTE   [facility]    Patient:Ángel Nesbitt  : 1985  Age: 36 year old  Today's Office Visit: 2021    History of present illness:     Ángel returns to clinic for follow-up on his seizure.  He only had an isolated convulsion on 21 while he was traveling.    He had a 3-hour video EEG at [facility] 2021 which showed moderate encephalopathy.  No epileptiform discharges or seizures were detected.  Brain MRI on 2021 showed diffuse white matter ischemic changes, otherwise unremarkable.    He says he can't sleep well. He works on  time, so he is at his laptop at 12 am -7 am 6 days a week. He tries to get sleep during the day, but the minor sound, wakes her up.  He can't get 2 hrs of sleep straight. He has been working like this since 2019.      Migraine headaches: She was started on propranolol as a prophylactic medication and Imitrex as needed in the previous visit.  He had a migraine this morning and took an Imitrex. He takes 2 of them usually and that usually helps.  He has headaches once or twice a week. He has noted headaches are somewhat better. He says his anxiety is through the roof and his kids say he shouts a lot.      Current Outpatient Medications   Medication Sig Dispense Refill     acetaminophen (TYLENOL) 500 MG tablet Take 500-1,000 mg by mouth every 6 hours as needed for mild pain       levETIRAcetam (KEPPRA) 1000 MG tablet Take 1 tablet (1,000 mg) by mouth daily 180 tablet 1     levETIRAcetam (KEPPRA) 500 MG tablet Take 1 tablet twice a day. 180 tablet 1     loratadine (CLARITIN) 10 MG tablet Take 10 mg by mouth daily       propranolol ER (INDERAL LA) 60 MG 24 hr capsule Take 1  tablet daily for 1 week and then increase to 1 tablet twice a day 180 capsule 1     SUMAtriptan (IMITREX) 50 MG tablet Take 1 tablet (50 mg) by mouth at onset of headache for migraine May repeat in 2 hours. Max 2 tablets/24 hours. 12 tablet 3     Exam:    /72 (BP Location: Right arm, Patient Position: Sitting, Cuff Size: Adult Regular)   Pulse 73   Wt 88.5 kg (195 lb)   BMI 26.82 kg/m      Wt Readings from Last 5 Encounters:   07/29/21 90 kg (198 lb 6.4 oz)      General Appearance: Alert, awake, cooperative, pleasant, NAD  Gait and tandem gait: steady  Attention Span:  Normal  Language/speech: no aphasia or dysarthria  Extraocular Movements:  Normal  Coordination:  Normal finger to nose  Facial Strength:  Normal  Motor Exam: normal tone, bulk and strength 5/5 bilaterally    Assessment/Plan:    #1 An isolated Convusion:  EEG and brain MRI were unremarkable. I discussed with the patient that based on his results, there is a low risk for recurrence of seizures and he does not need to continue taking Keppra; however, there is no guaranty he wouldn't have another seizure in his life. He would like to come off the medication and would consider restarting it if he had another seizure.     #2 Migraine headache: he feels his headaches are better , but he is still getting them once or twice a week. He also complains of anxiety, not sleeping well and shoulder pain. I discussed changing propranolol to gabapentin.     #3 Anxiety: I discussed starting him on gabapentin. Based on his tolerance of potential drowsiness and dizziness he may take up to 100 mg tid.     - Taper levetiracetam to off by decreasing to 500 mg daily for 1 week and then stop.  - Discontinue propranolol.  - Take gabapentin 100 mg up to three times a day if tolerated.  - Take sumatriptan 50 mg as needed for headaches, may repeat in 2 hrs. Do not exceed 2 tabs/d or 6 tabs/wk  - Follow up in 2 months.         As described above, I met with the patient  for 25 minutes and during this time counseling was greater than 50% of the visit time.  Neha Cr MD        Again, thank you for allowing me to participate in the care of your patient.        Sincerely,        Neha Cr MD

## 2021-11-05 NOTE — PATIENT INSTRUCTIONS
#1 Decrease your Keppra (levetiracetam) to 500 mg daily for 1 week and then stop.   #2 Start taking gabapentin 100 mg three times a day if tolerated.  #3 Discontinue propranolol

## 2021-11-05 NOTE — LETTER
Date:November 8, 2021      Patient was self referred, no letter generated. Do not send.        [facility] Health Information

## 2021-11-05 NOTE — PROGRESS NOTES
NEUROLOGY PROGRESS NOTE   [facility]    Patient:Ángel Nesbitt  : 1985  Age: 36 year old  Today's Office Visit: 2021    History of present illness:     Ángel returns to clinic for follow-up on his seizure.  He only had an isolated convulsion on 21 while he was traveling.    He had a 3-hour video EEG at [facility] 2021 which showed moderate encephalopathy.  No epileptiform discharges or seizures were detected.  Brain MRI on 2021 showed diffuse white matter ischemic changes, otherwise unremarkable.    He says he can't sleep well. He works on  time, so he is at his laptop at 12 am -7 am 6 days a week. He tries to get sleep during the day, but the minor sound, wakes her up.  He can't get 2 hrs of sleep straight. He has been working like this since 2019.      Migraine headaches: She was started on propranolol as a prophylactic medication and Imitrex as needed in the previous visit.  He had a migraine this morning and took an Imitrex. He takes 2 of them usually and that usually helps.  He has headaches once or twice a week. He has noted headaches are somewhat better. He says his anxiety is through the roof and his kids say he shouts a lot.      Current Outpatient Medications   Medication Sig Dispense Refill     acetaminophen (TYLENOL) 500 MG tablet Take 500-1,000 mg by mouth every 6 hours as needed for mild pain       levETIRAcetam (KEPPRA) 1000 MG tablet Take 1 tablet (1,000 mg) by mouth daily 180 tablet 1     levETIRAcetam (KEPPRA) 500 MG tablet Take 1 tablet twice a day. 180 tablet 1     loratadine (CLARITIN) 10 MG tablet Take 10 mg by mouth daily       propranolol ER (INDERAL LA) 60 MG 24 hr capsule Take 1 tablet daily for 1 week and then increase to 1 tablet twice a day 180 capsule 1     SUMAtriptan (IMITREX) 50 MG tablet Take 1 tablet (50 mg) by mouth at onset of headache for migraine May repeat in 2 hours. Max 2 tablets/24 hours. 12 tablet 3     Exam:    /72  (BP Location: Right arm, Patient Position: Sitting, Cuff Size: Adult Regular)   Pulse 73   Wt 88.5 kg (195 lb)   BMI 26.82 kg/m      Wt Readings from Last 5 Encounters:   07/29/21 90 kg (198 lb 6.4 oz)      General Appearance: Alert, awake, cooperative, pleasant, NAD  Gait and tandem gait: steady  Attention Span:  Normal  Language/speech: no aphasia or dysarthria  Extraocular Movements:  Normal  Coordination:  Normal finger to nose  Facial Strength:  Normal  Motor Exam: normal tone, bulk and strength 5/5 bilaterally    Assessment/Plan:    #1 An isolated Convusion:  EEG and brain MRI were unremarkable. I discussed with the patient that based on his results, there is a low risk for recurrence of seizures and he does not need to continue taking Keppra; however, there is no guaranty he wouldn't have another seizure in his life. He would like to come off the medication and would consider restarting it if he had another seizure.     #2 Migraine headache: he feels his headaches are better , but he is still getting them once or twice a week. He also complains of anxiety, not sleeping well and shoulder pain. I discussed changing propranolol to gabapentin.     #3 Anxiety: I discussed starting him on gabapentin. Based on his tolerance of potential drowsiness and dizziness he may take up to 100 mg tid.     - Taper levetiracetam to off by decreasing to 500 mg daily for 1 week and then stop.  - Discontinue propranolol.  - Take gabapentin 100 mg up to three times a day if tolerated.  - Take sumatriptan 50 mg as needed for headaches, may repeat in 2 hrs. Do not exceed 2 tabs/d or 6 tabs/wk  - Follow up in 2 months.         As described above, I met with the patient for 25 minutes and during this time counseling was greater than 50% of the visit time.  Neha Cr MD

## 2022-01-28 ENCOUNTER — VIRTUAL VISIT (OUTPATIENT)
Dept: NEUROLOGY | Facility: CLINIC | Age: 37
End: 2022-01-28
Payer: COMMERCIAL

## 2022-01-28 DIAGNOSIS — Z53.9 NO SHOW: Primary | ICD-10-CM

## 2022-01-28 PROCEDURE — 99207 PR NO CHARGE LOS: CPT | Performed by: PSYCHIATRY & NEUROLOGY

## 2022-01-28 NOTE — LETTER
1/28/2022         RE: Ángel Nesbitt  [address]  [facility] [address]        Dear Colleague,    Thank you for referring your patient, Ángel Nesbitt, to the [facility] NEUROLOGY CLINIC [place]. Please see a copy of my visit note below.          No show      Again, thank you for allowing me to participate in the care of your patient.        Sincerely,        Neha Cr MD

## 2022-10-03 ENCOUNTER — HEALTH MAINTENANCE LETTER (OUTPATIENT)
Age: 37
End: 2022-10-03

## 2022-11-16 ENCOUNTER — TELEPHONE (OUTPATIENT)
Dept: FAMILY MEDICINE | Facility: CLINIC | Age: 37
End: 2022-11-16

## 2022-11-16 NOTE — TELEPHONE ENCOUNTER
Patient Quality Outreach    Patient is due for the following:   Depression  -  PHQ-9 needed  Physical Preventive Adult Physical    Next Steps:   Schedule a Adult Preventative    Type of outreach:    Sent [employer] message.      Questions for provider review:    None     Maureen Champion CMA  Chart routed to Care Team.

## 2022-11-16 NOTE — LETTER
[facility]  [address]  [place] [address]  [phone]      November 23, 2022    To  Ángel Nesbitt  [facility] [address]  [facility] [address]        Your team at [facility] cares about your health. We have reviewed your chart and based on our findings; we are making the following recommendations to better manage your health.     You are in particular need of attention regarding the following:     Complete the attached questionnaires to ensure your mental health needs are being properly met.  Please fill them out and send back to us via [employer], and feel free to call us with any questions or concerns at [phone].    PREVENTATIVE VISIT: Physical    If you have already completed these items, please contact the clinic via phone or   [employer] so your care team can review and update your records. Thank you for   choosing [facility] for your healthcare needs. For any questions,   concerns, or to schedule an appointment please contact our clinic.    Healthy Regards,      Your [facility] Care Team

## 2022-11-23 NOTE — TELEPHONE ENCOUNTER
Patient Quality Outreach    Patient is due for the following:   Depression  -  PHQ-9 needed  Physical Annual Wellness Visit    Next Steps:   Schedule a office visit for Yearly Depression Screening Adult Preventative    Type of outreach:    Sent [employer] message.    Next Steps:  Reach out within 90 days via Letter.    Max number of attempts reached: Yes. Will try again in 90 days if patient still on fail list.    Questions for provider review:    None     Maureen Champion [facility]  Chart routed to Care Team.

## 2023-10-21 ENCOUNTER — HEALTH MAINTENANCE LETTER (OUTPATIENT)
Age: 38
End: 2023-10-21

## 2024-12-14 ENCOUNTER — HEALTH MAINTENANCE LETTER (OUTPATIENT)
Age: 39
End: 2024-12-14